# Patient Record
Sex: MALE | Race: WHITE | Employment: FULL TIME | URBAN - METROPOLITAN AREA
[De-identification: names, ages, dates, MRNs, and addresses within clinical notes are randomized per-mention and may not be internally consistent; named-entity substitution may affect disease eponyms.]

---

## 2018-03-09 ENCOUNTER — OFFICE VISIT (OUTPATIENT)
Dept: INTERNAL MEDICINE CLINIC | Facility: CLINIC | Age: 38
End: 2018-03-09

## 2018-03-09 VITALS
TEMPERATURE: 98 F | SYSTOLIC BLOOD PRESSURE: 106 MMHG | OXYGEN SATURATION: 98 % | DIASTOLIC BLOOD PRESSURE: 62 MMHG | WEIGHT: 238 LBS | HEIGHT: 71 IN | HEART RATE: 59 BPM | BODY MASS INDEX: 33.32 KG/M2

## 2018-03-09 DIAGNOSIS — Z00.00 PHYSICAL EXAM: Primary | ICD-10-CM

## 2018-03-09 DIAGNOSIS — F32.A ACUTE DEPRESSION: ICD-10-CM

## 2018-03-09 PROCEDURE — 99395 PREV VISIT EST AGE 18-39: CPT | Performed by: INTERNAL MEDICINE

## 2018-03-09 RX ORDER — DULOXETIN HYDROCHLORIDE 30 MG/1
30 CAPSULE, DELAYED RELEASE ORAL DAILY
Qty: 30 CAPSULE | Refills: 5 | Status: SHIPPED | OUTPATIENT
Start: 2018-03-09 | End: 2018-07-12

## 2018-03-09 RX ORDER — ALPRAZOLAM 0.25 MG/1
0.25 TABLET ORAL 3 TIMES DAILY PRN
Qty: 40 TABLET | Refills: 0 | Status: SHIPPED | OUTPATIENT
Start: 2018-03-09 | End: 2018-07-12

## 2018-03-09 NOTE — PROGRESS NOTES
Leonardo Pandey is a 40year old male who presents for a complete physical exam.   HPI:   Pt complains of:    Patient presents with:  Physical: Annual Physical visit, weight up now, was down last year with diet and exercise  Checkup: Has been having a hard t Negative for Cold intolerance and heat intolerance. Neuro: Negative for Gait disturbance and memory impairment. Psych: Negative for Anxiety and depression. Integumentary: Negative for Hives and rash. MS: Negative muscle weakness.  negative for joint carlito discussed  Use the xanax 3 times per day for 2-3 days then down to 2 times per day for 3 days then down to just at night for 3-5 days then try to stop and use only as needed  Take the cymbalta daily and it should start working in 1-2 wks  - DULoxetine HCl

## 2018-03-12 ENCOUNTER — LAB ENCOUNTER (OUTPATIENT)
Dept: LAB | Age: 38
End: 2018-03-12
Attending: INTERNAL MEDICINE
Payer: COMMERCIAL

## 2018-03-12 DIAGNOSIS — Z00.00 PHYSICAL EXAM: ICD-10-CM

## 2018-03-12 LAB
ALBUMIN SERPL BCP-MCNC: 4.3 G/DL (ref 3.5–4.8)
ALBUMIN/GLOB SERPL: 1.7 {RATIO} (ref 1–2)
ALP SERPL-CCNC: 60 U/L (ref 32–100)
ALT SERPL-CCNC: 35 U/L (ref 17–63)
ANION GAP SERPL CALC-SCNC: 11 MMOL/L (ref 0–18)
AST SERPL-CCNC: 43 U/L (ref 15–41)
BACTERIA UR QL AUTO: NEGATIVE /HPF
BASOPHILS # BLD: 0.1 K/UL (ref 0–0.2)
BASOPHILS NFR BLD: 1 %
BILIRUB SERPL-MCNC: 0.9 MG/DL (ref 0.3–1.2)
BILIRUB UR QL: NEGATIVE
BUN SERPL-MCNC: 17 MG/DL (ref 8–20)
BUN/CREAT SERPL: 15.9 (ref 10–20)
CALCIUM SERPL-MCNC: 9.1 MG/DL (ref 8.5–10.5)
CHLORIDE SERPL-SCNC: 100 MMOL/L (ref 95–110)
CHOLEST SERPL-MCNC: 190 MG/DL (ref 110–200)
CLARITY UR: CLEAR
CO2 SERPL-SCNC: 27 MMOL/L (ref 22–32)
COLOR UR: YELLOW
CREAT SERPL-MCNC: 1.07 MG/DL (ref 0.5–1.5)
EOSINOPHIL # BLD: 0.1 K/UL (ref 0–0.7)
EOSINOPHIL NFR BLD: 1 %
ERYTHROCYTE [DISTWIDTH] IN BLOOD BY AUTOMATED COUNT: 13.3 % (ref 11–15)
GLOBULIN PLAS-MCNC: 2.6 G/DL (ref 2.5–3.7)
GLUCOSE SERPL-MCNC: 79 MG/DL (ref 70–99)
GLUCOSE UR-MCNC: NEGATIVE MG/DL
HCT VFR BLD AUTO: 45 % (ref 41–52)
HDLC SERPL-MCNC: 48 MG/DL
HGB BLD-MCNC: 15.3 G/DL (ref 13.5–17.5)
HGB UR QL STRIP.AUTO: NEGATIVE
KETONES UR-MCNC: NEGATIVE MG/DL
LDLC SERPL CALC-MCNC: 122 MG/DL (ref 0–99)
LEUKOCYTE ESTERASE UR QL STRIP.AUTO: NEGATIVE
LYMPHOCYTES # BLD: 2 K/UL (ref 1–4)
LYMPHOCYTES NFR BLD: 28 %
MCH RBC QN AUTO: 30 PG (ref 27–32)
MCHC RBC AUTO-ENTMCNC: 34.1 G/DL (ref 32–37)
MCV RBC AUTO: 88 FL (ref 80–100)
MONOCYTES # BLD: 0.6 K/UL (ref 0–1)
MONOCYTES NFR BLD: 9 %
NEUTROPHILS # BLD AUTO: 4.3 K/UL (ref 1.8–7.7)
NEUTROPHILS NFR BLD: 62 %
NITRITE UR QL STRIP.AUTO: NEGATIVE
NONHDLC SERPL-MCNC: 142 MG/DL
OSMOLALITY UR CALC.SUM OF ELEC: 286 MOSM/KG (ref 275–295)
PATIENT FASTING: YES
PH UR: 5 [PH] (ref 5–8)
PLATELET # BLD AUTO: 206 K/UL (ref 140–400)
PMV BLD AUTO: 9.3 FL (ref 7.4–10.3)
POTASSIUM SERPL-SCNC: 3.9 MMOL/L (ref 3.3–5.1)
PROT SERPL-MCNC: 6.9 G/DL (ref 5.9–8.4)
PROT UR-MCNC: NEGATIVE MG/DL
RBC # BLD AUTO: 5.12 M/UL (ref 4.5–5.9)
RBC #/AREA URNS AUTO: 1 /HPF
SODIUM SERPL-SCNC: 138 MMOL/L (ref 136–144)
SP GR UR STRIP: 1.02 (ref 1–1.03)
TRIGL SERPL-MCNC: 98 MG/DL (ref 1–149)
TSH SERPL-ACNC: 1.78 UIU/ML (ref 0.45–5.33)
UROBILINOGEN UR STRIP-ACNC: <2
VIT C UR-MCNC: 40 MG/DL
WBC # BLD AUTO: 7 K/UL (ref 4–11)
WBC #/AREA URNS AUTO: <1 /HPF

## 2018-03-12 PROCEDURE — 80050 GENERAL HEALTH PANEL: CPT

## 2018-03-12 PROCEDURE — 80061 LIPID PANEL: CPT

## 2018-03-12 PROCEDURE — 36415 COLL VENOUS BLD VENIPUNCTURE: CPT

## 2018-03-12 PROCEDURE — 81003 URINALYSIS AUTO W/O SCOPE: CPT

## 2018-03-12 PROCEDURE — 80053 COMPREHEN METABOLIC PANEL: CPT

## 2018-03-12 PROCEDURE — 84403 ASSAY OF TOTAL TESTOSTERONE: CPT

## 2018-03-12 PROCEDURE — 84443 ASSAY THYROID STIM HORMONE: CPT

## 2018-03-12 PROCEDURE — 84402 ASSAY OF FREE TESTOSTERONE: CPT

## 2018-03-12 PROCEDURE — 82306 VITAMIN D 25 HYDROXY: CPT

## 2018-03-14 LAB — 25(OH)D3 SERPL-MCNC: 29.5 NG/ML

## 2018-03-15 LAB
TESTOSTERONE, FREE, S: 15.7 NG/DL
TESTOSTERONE, TOTAL, S: 492 NG/DL

## 2018-03-20 ENCOUNTER — TELEPHONE (OUTPATIENT)
Dept: INTERNAL MEDICINE CLINIC | Facility: CLINIC | Age: 38
End: 2018-03-20

## 2018-03-20 NOTE — TELEPHONE ENCOUNTER
Nursing please call patient, tell him I did review his lab work, everything here looks basically okay, cholesterol is just a bit off, nothing diet and exercise will not fix, his testosterone levels are good, and everything else in his profile look steady.

## 2018-07-12 NOTE — PATIENT INSTRUCTIONS
1. Primary insomnia  Use the meds with melatonin 3mg nightly as you have, this may need to go up, ok to do, try not to go over 10 mg nightly without talking to me    2.  Vitamin D deficiency  Stable, but with your vitamin D levels I recommend at least vitam

## 2018-07-12 NOTE — PROGRESS NOTES
HPI:   Crys Castanon is a 45year old male who presents for complains of: Patient presents with:   Follow - Up: F/U on medications, stopped taking them, felt like hung over, taking some OTC sleep aid, has melatonin in it  working nights    EXAM:   /84 DO  7/12/2018  2:19 PM

## 2019-04-25 ENCOUNTER — OFFICE VISIT (OUTPATIENT)
Dept: INTERNAL MEDICINE CLINIC | Facility: CLINIC | Age: 39
End: 2019-04-25
Payer: COMMERCIAL

## 2019-04-25 VITALS
SYSTOLIC BLOOD PRESSURE: 112 MMHG | OXYGEN SATURATION: 97 % | HEART RATE: 62 BPM | TEMPERATURE: 98 F | HEIGHT: 71 IN | DIASTOLIC BLOOD PRESSURE: 80 MMHG | BODY MASS INDEX: 36.82 KG/M2 | WEIGHT: 263 LBS

## 2019-04-25 DIAGNOSIS — R63.5 WEIGHT GAIN: ICD-10-CM

## 2019-04-25 DIAGNOSIS — E66.9 OBESITY (BMI 30.0-34.9): ICD-10-CM

## 2019-04-25 DIAGNOSIS — Z00.00 PHYSICAL EXAM: Primary | ICD-10-CM

## 2019-04-25 PROCEDURE — 99395 PREV VISIT EST AGE 18-39: CPT | Performed by: INTERNAL MEDICINE

## 2019-04-25 NOTE — PROGRESS NOTES
Dl Brown is a 44year old male who presents for a complete physical exam.   HPI:   Pt complains of:    Patient presents with:  Physical: Feels well. 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         Wt Read Left arm, Patient Position: Sitting, Cuff Size: large)   Pulse 62   Temp 98 °F (36.7 °C) (Oral)   Ht 5' 11\" (1.803 m)   Wt 263 lb (119.3 kg)   SpO2 97%   BMI 36.68 kg/m²   Body mass index is 36.68 kg/m².    PHYSICAL EXAMINATION:  Vital signs: See chart   G

## 2019-05-22 ENCOUNTER — LAB ENCOUNTER (OUTPATIENT)
Dept: LAB | Age: 39
End: 2019-05-22
Attending: INTERNAL MEDICINE
Payer: COMMERCIAL

## 2019-05-22 DIAGNOSIS — Z00.00 PHYSICAL EXAM: ICD-10-CM

## 2019-05-22 PROCEDURE — 36415 COLL VENOUS BLD VENIPUNCTURE: CPT

## 2019-05-22 PROCEDURE — 84443 ASSAY THYROID STIM HORMONE: CPT

## 2019-05-22 PROCEDURE — 81003 URINALYSIS AUTO W/O SCOPE: CPT | Performed by: INTERNAL MEDICINE

## 2019-05-22 PROCEDURE — 80053 COMPREHEN METABOLIC PANEL: CPT

## 2019-05-22 PROCEDURE — 84402 ASSAY OF FREE TESTOSTERONE: CPT

## 2019-05-22 PROCEDURE — 85025 COMPLETE CBC W/AUTO DIFF WBC: CPT

## 2019-05-22 PROCEDURE — 82306 VITAMIN D 25 HYDROXY: CPT

## 2019-05-22 PROCEDURE — 80061 LIPID PANEL: CPT

## 2019-05-22 PROCEDURE — 84403 ASSAY OF TOTAL TESTOSTERONE: CPT

## 2019-05-30 ENCOUNTER — PATIENT MESSAGE (OUTPATIENT)
Dept: INTERNAL MEDICINE CLINIC | Facility: CLINIC | Age: 39
End: 2019-05-30

## 2019-05-30 NOTE — TELEPHONE ENCOUNTER
From: Carolyn Castro  To: Pratima Brown DO  Sent: 5/30/2019 5:41 PM CDT  Subject: Test Results Question    I keep getting message that say I have test results, but can somebody explain or tell me if something is wrong please

## 2019-10-24 ENCOUNTER — OFFICE VISIT (OUTPATIENT)
Dept: INTERNAL MEDICINE CLINIC | Facility: CLINIC | Age: 39
End: 2019-10-24
Payer: COMMERCIAL

## 2019-10-24 VITALS
SYSTOLIC BLOOD PRESSURE: 120 MMHG | WEIGHT: 237.63 LBS | DIASTOLIC BLOOD PRESSURE: 74 MMHG | OXYGEN SATURATION: 99 % | HEART RATE: 67 BPM | BODY MASS INDEX: 33 KG/M2 | TEMPERATURE: 99 F

## 2019-10-24 DIAGNOSIS — R63.4 WEIGHT LOSS: ICD-10-CM

## 2019-10-24 DIAGNOSIS — E66.9 OBESITY (BMI 30.0-34.9): Primary | ICD-10-CM

## 2019-10-24 PROCEDURE — 99214 OFFICE O/P EST MOD 30 MIN: CPT | Performed by: INTERNAL MEDICINE

## 2019-10-24 NOTE — PATIENT INSTRUCTIONS
1. Obesity (BMI 30.0-34. 9)  Weight baseline may be 240, this seems to be ok, keep working at it, exercise is key    2.  Weight loss  Cont monitoring and baseline

## 2019-10-24 NOTE — PROGRESS NOTES
HPI:   Korey De Jesus is a 44year old male who presents for complains of: Patient presents with:  Checkup: 6 month, diet and exercise, he claims he is eating (a lot better). protein replacement, after working out 3-5 times per week.  10-15 miles per week,

## 2020-03-06 ENCOUNTER — OFFICE VISIT (OUTPATIENT)
Dept: INTERNAL MEDICINE CLINIC | Facility: CLINIC | Age: 40
End: 2020-03-06
Payer: COMMERCIAL

## 2020-03-06 VITALS
WEIGHT: 220.38 LBS | SYSTOLIC BLOOD PRESSURE: 120 MMHG | HEIGHT: 71 IN | BODY MASS INDEX: 30.85 KG/M2 | HEART RATE: 57 BPM | DIASTOLIC BLOOD PRESSURE: 66 MMHG | TEMPERATURE: 98 F | OXYGEN SATURATION: 98 %

## 2020-03-06 DIAGNOSIS — R10.13 EPIGASTRIC PAIN: Primary | ICD-10-CM

## 2020-03-06 PROCEDURE — 99213 OFFICE O/P EST LOW 20 MIN: CPT | Performed by: INTERNAL MEDICINE

## 2020-03-06 RX ORDER — PANTOPRAZOLE SODIUM 40 MG/1
40 TABLET, DELAYED RELEASE ORAL
Qty: 30 TABLET | Refills: 2 | Status: SHIPPED | OUTPATIENT
Start: 2020-03-06 | End: 2021-12-21

## 2020-03-06 NOTE — PROGRESS NOTES
Josie Landis is a 44year old male.     HPI:   Patient presents with:  Stomach Pain: started sunday with stomach cramps, mostly after eating, no nausea      45 y/o M with 5 d h/o epigastric discomfort; denies heartburn; no constipation; no diarrhea; no dayo thyromegaly  Cardiovascular: RRR, S1, S2, no S3 or murmur  Respiratory: lungs without crackles or wheezes  Abdomen: normoactive bowel sounds, soft, non-tender and non-distended  Extremities: no clubbing, cyanosis or edema           ASSESSMENT/PLAN:   Rey

## 2020-04-24 ENCOUNTER — OFFICE VISIT (OUTPATIENT)
Dept: INTERNAL MEDICINE CLINIC | Facility: CLINIC | Age: 40
End: 2020-04-24
Payer: COMMERCIAL

## 2020-04-24 VITALS
WEIGHT: 209 LBS | HEIGHT: 71 IN | DIASTOLIC BLOOD PRESSURE: 78 MMHG | OXYGEN SATURATION: 99 % | TEMPERATURE: 98 F | SYSTOLIC BLOOD PRESSURE: 120 MMHG | BODY MASS INDEX: 29.26 KG/M2 | RESPIRATION RATE: 16 BRPM | HEART RATE: 51 BPM

## 2020-04-24 DIAGNOSIS — K21.9 GASTROESOPHAGEAL REFLUX DISEASE WITHOUT ESOPHAGITIS: ICD-10-CM

## 2020-04-24 DIAGNOSIS — R14.3 FLATULENCE: ICD-10-CM

## 2020-04-24 DIAGNOSIS — Z00.00 PHYSICAL EXAM: Primary | ICD-10-CM

## 2020-04-24 PROCEDURE — 93000 ELECTROCARDIOGRAM COMPLETE: CPT | Performed by: INTERNAL MEDICINE

## 2020-04-24 PROCEDURE — 99396 PREV VISIT EST AGE 40-64: CPT | Performed by: INTERNAL MEDICINE

## 2020-04-24 RX ORDER — ZINC OXIDE 13 %
1 CREAM (GRAM) TOPICAL 2 TIMES DAILY
Qty: 60 CAPSULE | Refills: 2 | COMMUNITY
Start: 2020-04-24 | End: 2021-12-21

## 2020-04-24 NOTE — PROGRESS NOTES
Corky Brunson is a 36year old male who presents for a complete physical exam.   HPI:   Pt complains of:    Patient presents with:  Physical: 6 month and epigastic pain. Denies pain. lost 36 pounds with his workout challenge and was able to maintain.     P weight loss. ENMT: Negative for Nasal drainage and sinus pressure. Eyes: Negative for Vision changes. Respiratory: Negative for Cough, dyspnea and wheezing. Cardio: Negative Chest pain and irregular heartbeat/palpitations.   GI: Negative for Abdominal p after completed, call with questions. - CBC WITH DIFFERENTIAL WITH PLATELET; Future  - COMP METABOLIC PANEL (14); Future  - TESTOSTERONE,FREE AND TOTAL;  Future  - TSH W REFLEX TO FREE T4; Future  - URINALYSIS WITH CULTURE REFLEX  - VITAMIN D, 25-HYDROXY

## 2020-05-07 ENCOUNTER — PATIENT MESSAGE (OUTPATIENT)
Dept: INTERNAL MEDICINE CLINIC | Facility: CLINIC | Age: 40
End: 2020-05-07

## 2020-05-07 RX ORDER — AZITHROMYCIN 250 MG/1
TABLET, FILM COATED ORAL
Qty: 6 TABLET | Refills: 0 | Status: SHIPPED | OUTPATIENT
Start: 2020-05-07 | End: 2020-05-12

## 2020-05-07 NOTE — TELEPHONE ENCOUNTER
From: Yumiko Geren  To: Jose Armando Romo DO  Sent: 5/7/2020 3:04 AM CDT  Subject: Prescription Question    Doc, the probiotic that we talked about taking doesn’t seem to be helping.  Can you send in a prescription for the antibiotic you were talking a

## 2021-12-21 ENCOUNTER — OFFICE VISIT (OUTPATIENT)
Dept: INTERNAL MEDICINE CLINIC | Facility: CLINIC | Age: 41
End: 2021-12-21
Payer: COMMERCIAL

## 2021-12-21 VITALS
HEART RATE: 59 BPM | SYSTOLIC BLOOD PRESSURE: 108 MMHG | WEIGHT: 239.38 LBS | HEIGHT: 71 IN | TEMPERATURE: 98 F | BODY MASS INDEX: 33.51 KG/M2 | DIASTOLIC BLOOD PRESSURE: 66 MMHG | OXYGEN SATURATION: 98 %

## 2021-12-21 DIAGNOSIS — N52.9 ERECTILE DYSFUNCTION, UNSPECIFIED ERECTILE DYSFUNCTION TYPE: ICD-10-CM

## 2021-12-21 DIAGNOSIS — Z00.00 PHYSICAL EXAM: Primary | ICD-10-CM

## 2021-12-21 DIAGNOSIS — Z00.00 ANNUAL PHYSICAL EXAM: ICD-10-CM

## 2021-12-21 PROCEDURE — 3078F DIAST BP <80 MM HG: CPT | Performed by: INTERNAL MEDICINE

## 2021-12-21 PROCEDURE — 3074F SYST BP LT 130 MM HG: CPT | Performed by: INTERNAL MEDICINE

## 2021-12-21 PROCEDURE — 99396 PREV VISIT EST AGE 40-64: CPT | Performed by: INTERNAL MEDICINE

## 2021-12-21 PROCEDURE — 93000 ELECTROCARDIOGRAM COMPLETE: CPT | Performed by: INTERNAL MEDICINE

## 2021-12-21 PROCEDURE — 3008F BODY MASS INDEX DOCD: CPT | Performed by: INTERNAL MEDICINE

## 2021-12-21 NOTE — PATIENT INSTRUCTIONS
1. Physical exam  Physical exam instruction: Improve diet and exercise, complete fasting labs in the near future and you will be called with results 5-7 days after completed, call with questions.   Call the central scheduling number at [phone] to sched

## 2021-12-21 NOTE — PROGRESS NOTES
Corky Brunson is a 39year old male who presents for a complete physical exam.   HPI:   Pt complains of:    Patient presents with:  Physical: Annual Px, working outside. stable and doing well.     Physical exam: Patient stable, doing well, still very physi Dysuria and urinary frequency. Endocrine: Negative for Cold intolerance and heat intolerance. Neuro: Negative for Gait disturbance and memory impairment. Psych: Negative for Anxiety and depression. Integumentary: Negative for Hives and rash.   MS: Jose Armando Ralph REFLEX  - VITAMIN D, 25-HYDROXY; Future  - PSA SCREEN; Future    2. Annual physical exam  As above, EKG looks good  - ELECTROCARDIOGRAM, COMPLETE: Sinus bradycardic rhythm at 51 bpm, no ST-T wave changes, normal EKG with the exception of rate    3.  Erectil

## 2021-12-23 ENCOUNTER — LAB ENCOUNTER (OUTPATIENT)
Dept: LAB | Age: 41
End: 2021-12-23
Attending: INTERNAL MEDICINE
Payer: COMMERCIAL

## 2021-12-23 DIAGNOSIS — Z00.00 PHYSICAL EXAM: ICD-10-CM

## 2021-12-23 PROCEDURE — 82306 VITAMIN D 25 HYDROXY: CPT

## 2021-12-23 PROCEDURE — 81003 URINALYSIS AUTO W/O SCOPE: CPT | Performed by: INTERNAL MEDICINE

## 2021-12-23 PROCEDURE — 85025 COMPLETE CBC W/AUTO DIFF WBC: CPT

## 2021-12-23 PROCEDURE — 84402 ASSAY OF FREE TESTOSTERONE: CPT

## 2021-12-23 PROCEDURE — 84443 ASSAY THYROID STIM HORMONE: CPT

## 2021-12-23 PROCEDURE — 84403 ASSAY OF TOTAL TESTOSTERONE: CPT

## 2021-12-23 PROCEDURE — 80061 LIPID PANEL: CPT

## 2021-12-23 PROCEDURE — 80053 COMPREHEN METABOLIC PANEL: CPT

## 2021-12-23 PROCEDURE — 36415 COLL VENOUS BLD VENIPUNCTURE: CPT

## 2022-07-06 ENCOUNTER — OFFICE VISIT (OUTPATIENT)
Dept: SURGERY | Facility: CLINIC | Age: 42
End: 2022-07-06
Payer: COMMERCIAL

## 2022-07-06 VITALS — SYSTOLIC BLOOD PRESSURE: 121 MMHG | DIASTOLIC BLOOD PRESSURE: 75 MMHG | HEART RATE: 54 BPM

## 2022-07-06 DIAGNOSIS — R39.14 FEELING OF INCOMPLETE BLADDER EMPTYING: Primary | ICD-10-CM

## 2022-07-06 DIAGNOSIS — N52.8 OTHER MALE ERECTILE DYSFUNCTION: ICD-10-CM

## 2022-07-06 LAB
BILIRUBIN: NEGATIVE
GLUCOSE (URINE DIPSTICK): NEGATIVE MG/DL
KETONES (URINE DIPSTICK): NEGATIVE MG/DL
LEUKOCYTES: NEGATIVE
MULTISTIX LOT#: NORMAL NUMERIC
NITRITE, URINE: NEGATIVE
OCCULT BLOOD: NEGATIVE
PH, URINE: 5.5 (ref 4.5–8)
PROTEIN (URINE DIPSTICK): NEGATIVE MG/DL
SPECIFIC GRAVITY: 1.02 (ref 1–1.03)
UROBILINOGEN,SEMI-QN: 0.2 MG/DL (ref 0–1.9)

## 2022-07-06 PROCEDURE — 81002 URINALYSIS NONAUTO W/O SCOPE: CPT | Performed by: UROLOGY

## 2022-07-06 PROCEDURE — 99244 OFF/OP CNSLTJ NEW/EST MOD 40: CPT | Performed by: UROLOGY

## 2022-07-06 PROCEDURE — 3074F SYST BP LT 130 MM HG: CPT | Performed by: UROLOGY

## 2022-07-06 PROCEDURE — 3078F DIAST BP <80 MM HG: CPT | Performed by: UROLOGY

## 2022-07-06 RX ORDER — SILDENAFIL 50 MG/1
TABLET, FILM COATED ORAL
Qty: 10 TABLET | Refills: 1 | Status: SHIPPED | OUTPATIENT
Start: 2022-07-06

## 2022-11-11 ENCOUNTER — WALK IN (OUTPATIENT)
Dept: URGENT CARE | Age: 42
End: 2022-11-11
Attending: EMERGENCY MEDICINE

## 2022-11-11 VITALS
RESPIRATION RATE: 12 BRPM | TEMPERATURE: 97.8 F | OXYGEN SATURATION: 98 % | HEART RATE: 79 BPM | BODY MASS INDEX: 35 KG/M2 | WEIGHT: 250 LBS | SYSTOLIC BLOOD PRESSURE: 121 MMHG | HEIGHT: 71 IN | DIASTOLIC BLOOD PRESSURE: 82 MMHG

## 2022-11-11 DIAGNOSIS — L03.115 CELLULITIS OF RIGHT LEG: Primary | ICD-10-CM

## 2022-11-11 PROCEDURE — 99202 OFFICE O/P NEW SF 15 MIN: CPT

## 2022-11-11 RX ORDER — SILDENAFIL 50 MG/1
TABLET, FILM COATED ORAL
COMMUNITY
Start: 2022-08-07

## 2022-11-11 RX ORDER — CEPHALEXIN 500 MG/1
500 CAPSULE ORAL 4 TIMES DAILY
Qty: 28 CAPSULE | Refills: 0 | Status: SHIPPED | OUTPATIENT
Start: 2022-11-11 | End: 2022-11-18

## 2022-11-11 ASSESSMENT — PAIN SCALES - GENERAL: PAINLEVEL: 0
